# Patient Record
Sex: MALE | Race: WHITE | URBAN - NONMETROPOLITAN AREA
[De-identification: names, ages, dates, MRNs, and addresses within clinical notes are randomized per-mention and may not be internally consistent; named-entity substitution may affect disease eponyms.]

---

## 2023-02-27 ENCOUNTER — CLAIMS CREATED FROM THE CLAIM WINDOW (OUTPATIENT)
Dept: URBAN - NONMETROPOLITAN AREA CLINIC 2 | Facility: CLINIC | Age: 88
End: 2023-02-27
Payer: MEDICARE

## 2023-02-27 ENCOUNTER — LAB OUTSIDE AN ENCOUNTER (OUTPATIENT)
Dept: URBAN - NONMETROPOLITAN AREA CLINIC 2 | Facility: CLINIC | Age: 88
End: 2023-02-27

## 2023-02-27 ENCOUNTER — WEB ENCOUNTER (OUTPATIENT)
Dept: URBAN - NONMETROPOLITAN AREA CLINIC 2 | Facility: CLINIC | Age: 88
End: 2023-02-27

## 2023-02-27 ENCOUNTER — CLAIMS CREATED FROM THE CLAIM WINDOW (OUTPATIENT)
Dept: URBAN - NONMETROPOLITAN AREA CLINIC 2 | Facility: CLINIC | Age: 88
End: 2023-02-27

## 2023-02-27 ENCOUNTER — DASHBOARD ENCOUNTERS (OUTPATIENT)
Age: 88
End: 2023-02-27

## 2023-02-27 ENCOUNTER — OFFICE VISIT (OUTPATIENT)
Dept: URBAN - NONMETROPOLITAN AREA CLINIC 2 | Facility: CLINIC | Age: 88
End: 2023-02-27

## 2023-02-27 VITALS
HEIGHT: 73 IN | WEIGHT: 202 LBS | DIASTOLIC BLOOD PRESSURE: 66 MMHG | BODY MASS INDEX: 26.77 KG/M2 | HEART RATE: 60 BPM | SYSTOLIC BLOOD PRESSURE: 134 MMHG

## 2023-02-27 DIAGNOSIS — D64.9 ANEMIA, UNSPECIFIED TYPE: ICD-10-CM

## 2023-02-27 DIAGNOSIS — Z86.010 HISTORY OF COLON POLYPS: ICD-10-CM

## 2023-02-27 DIAGNOSIS — Z80.0 FAMILY HISTORY OF COLON CANCER: ICD-10-CM

## 2023-02-27 PROBLEM — 312824007: Status: ACTIVE | Noted: 2023-02-27

## 2023-02-27 PROBLEM — 428283002: Status: ACTIVE | Noted: 2023-02-27

## 2023-02-27 PROBLEM — 271737000: Status: ACTIVE | Noted: 2023-02-27

## 2023-02-27 PROCEDURE — 99204 OFFICE O/P NEW MOD 45 MIN: CPT

## 2023-02-27 RX ORDER — FINASTERIDE 5 MG/1
1 TABLET TABLET, FILM COATED ORAL ONCE A DAY
Status: ACTIVE | COMMUNITY

## 2023-02-27 RX ORDER — FUROSEMIDE 40 MG/1
1 TABLET TABLET ORAL ONCE A DAY
Status: ACTIVE | COMMUNITY

## 2023-02-27 RX ORDER — ANTIOX #8/OM3/DHA/EPA/LUT/ZEAX 250-2.5 MG
AS DIRECTED CAPSULE ORAL
Status: ACTIVE | COMMUNITY

## 2023-02-27 RX ORDER — POLYETHYLENE GLYCOL 3350, SODIUM SULFATE, SODIUM CHLORIDE, POTASSIUM CHLORIDE, ASCORBIC ACID, SODIUM ASCORBATE 140-9-5.2G
AS DIRECTED KIT ORAL ONCE
Qty: 1 KIT | Refills: 0 | OUTPATIENT
Start: 2023-02-27 | End: 2023-02-28

## 2023-02-27 RX ORDER — ROSUVASTATIN CALCIUM 20 MG/1
1 TABLET TABLET, FILM COATED ORAL ONCE A DAY
Status: ACTIVE | COMMUNITY

## 2023-02-27 RX ORDER — ASPIRIN 81 MG/1
1 TABLET TABLET, COATED ORAL ONCE A DAY
Status: ACTIVE | COMMUNITY

## 2023-02-27 RX ORDER — NITROGLYCERIN 0.4 MG/1
AS DIRECTED TABLET SUBLINGUAL
Status: ACTIVE | COMMUNITY

## 2023-02-27 RX ORDER — APIXABAN 2.5 MG/1
AS DIRECTED TABLET, FILM COATED ORAL
Status: ACTIVE | COMMUNITY

## 2023-02-27 RX ORDER — TAMSULOSIN HYDROCHLORIDE 0.4 MG/1
1 CAPSULE CAPSULE ORAL ONCE A DAY
Status: ACTIVE | COMMUNITY

## 2023-02-27 RX ORDER — MEMANTINE HYDROCHLORIDE 10 MG/1
1 TABLET TABLET ORAL ONCE A DAY
Status: ACTIVE | COMMUNITY

## 2023-02-27 RX ORDER — SERTRALINE HYDROCHLORIDE 25 MG/1
1 TABLET TABLET, FILM COATED ORAL ONCE A DAY
Status: ACTIVE | COMMUNITY

## 2023-02-27 RX ORDER — AMLODIPINE BESYLATE 5 MG/1
1 TABLET TABLET ORAL ONCE A DAY
Status: ACTIVE | COMMUNITY

## 2023-02-27 NOTE — HPI-TODAY'S VISIT:
2023 Mr. Pacheco is an 87-year-old male with past medical history of CKD stage III, anemia of chronic kidney disease and dementia, hypertension and atrial fibrillation.  He presents to clinic today for evaluation of anemia with hemoglobin 7-8.  He states remote history of gastric ulcer almost 25 years ago which she describes as a "aspirin attack" where he did not experience observation of melena or hematochezia.  He has experienced anemia for at least the past year.  He has a father who  at age 74 with colon cancer and patient completed his routine colonoscopies prior to age 80, history of polyps.  Patient denies any smoking history alcohol intake or drug use.  Neurology has stated observation of evidence of small strokes on MRI.  He resides at [facility] his wife  recently September this has been a very stressful year for him.  He continues on Eliquis as his anticoagulation for atrial fibrillation at 2.5 mg twice a day.  This is managed by a physician's assistant who visits his care home, working under a Dr. Taylor, Cardiologist.  He followed up with nephrology who stated his anemia is most likely secondary to a GI cause.  He denies any intake of NSAIDs.  He has a past medical history of CABG in . CT of the abdomen pelvis without contrast was completed on 2022 showed no evidence of obstruction or mass in the colon.  He denies fatigue, dizziness, shortness of breath. He denies any unintentional weight loss. He is unaware of his bowel habits or quality of BMs. He is retired from work as a  for [employer]. SHANE

## 2023-02-28 LAB
A/G RATIO: 1.8
ABSOLUTE BASOPHILS: 67
ABSOLUTE EOSINOPHILS: 482
ABSOLUTE LYMPHOCYTES: 1092
ABSOLUTE MONOCYTES: 610
ABSOLUTE NEUTROPHILS: 4449
ALBUMIN: 4.4
ALKALINE PHOSPHATASE: 80
ALT (SGPT): 12
AST (SGOT): 15
BASOPHILS: 1
BILIRUBIN, TOTAL: 0.5
BUN/CREATININE RATIO: 21
BUN: 50
CALCIUM: 9.6
CARBON DIOXIDE, TOTAL: 27
CHLORIDE: 103
CREATININE: 2.36
EGFR: 26
EOSINOPHILS: 7.2
FERRITIN, SERUM: 244
FOLATE (FOLIC ACID), SERUM: 9.5
GLOBULIN, TOTAL: 2.4
GLUCOSE: 89
HEMATOCRIT: 30
HEMOGLOBIN: 9.6
INR: 1.1
IRON BIND.CAP.(TIBC): 311
IRON SATURATION: 12
IRON: 37
LYMPHOCYTES: 16.3
MCH: 27.7
MCHC: 32
MCV: 86.5
MONOCYTES: 9.1
MPV: 10.2
NEUTROPHILS: 66.4
PLATELET COUNT: 166
POTASSIUM: 4.4
PROTEIN, TOTAL: 6.8
PT: 11.3
RDW: 15
RED BLOOD CELL COUNT: 3.47
SODIUM: 143
VITAMIN B12: 615
WHITE BLOOD CELL COUNT: 6.7

## 2023-03-01 ENCOUNTER — TELEPHONE ENCOUNTER (OUTPATIENT)
Dept: URBAN - METROPOLITAN AREA CLINIC 92 | Facility: CLINIC | Age: 88
End: 2023-03-01

## 2023-03-13 PROBLEM — 428283002 HISTORY OF POLYP OF COLON: Status: ACTIVE | Noted: 2023-03-13

## 2023-04-20 ENCOUNTER — OFFICE VISIT (OUTPATIENT)
Dept: URBAN - METROPOLITAN AREA MEDICAL CENTER 1 | Facility: MEDICAL CENTER | Age: 88
End: 2023-04-20

## 2023-04-27 ENCOUNTER — OFFICE VISIT (OUTPATIENT)
Dept: URBAN - METROPOLITAN AREA MEDICAL CENTER 1 | Facility: MEDICAL CENTER | Age: 88
End: 2023-04-27

## 2023-06-22 ENCOUNTER — OFFICE VISIT (OUTPATIENT)
Dept: URBAN - METROPOLITAN AREA MEDICAL CENTER 1 | Facility: MEDICAL CENTER | Age: 88
End: 2023-06-22

## 2023-06-26 ENCOUNTER — OFFICE VISIT (OUTPATIENT)
Dept: URBAN - NONMETROPOLITAN AREA CLINIC 2 | Facility: CLINIC | Age: 88
End: 2023-06-26

## 2023-08-30 ENCOUNTER — TELEPHONE ENCOUNTER (OUTPATIENT)
Dept: URBAN - NONMETROPOLITAN AREA CLINIC 2 | Facility: CLINIC | Age: 88
End: 2023-08-30

## 2023-08-31 ENCOUNTER — OFFICE VISIT (OUTPATIENT)
Dept: URBAN - METROPOLITAN AREA MEDICAL CENTER 1 | Facility: MEDICAL CENTER | Age: 88
End: 2023-08-31